# Patient Record
Sex: FEMALE | Race: WHITE
[De-identification: names, ages, dates, MRNs, and addresses within clinical notes are randomized per-mention and may not be internally consistent; named-entity substitution may affect disease eponyms.]

---

## 2018-01-19 ENCOUNTER — HOSPITAL ENCOUNTER (EMERGENCY)
Dept: HOSPITAL 74 - JERFT | Age: 53
Discharge: HOME | End: 2018-01-19
Payer: COMMERCIAL

## 2018-01-19 VITALS — HEART RATE: 107 BPM | SYSTOLIC BLOOD PRESSURE: 107 MMHG | TEMPERATURE: 99 F | DIASTOLIC BLOOD PRESSURE: 76 MMHG

## 2018-01-19 VITALS — BODY MASS INDEX: 30.9 KG/M2

## 2018-01-19 DIAGNOSIS — E11.9: ICD-10-CM

## 2018-01-19 DIAGNOSIS — Z79.84: ICD-10-CM

## 2018-01-19 DIAGNOSIS — E03.9: ICD-10-CM

## 2018-01-19 DIAGNOSIS — J20.9: Primary | ICD-10-CM

## 2018-01-19 PROCEDURE — 3E0F7GC INTRODUCTION OF OTHER THERAPEUTIC SUBSTANCE INTO RESPIRATORY TRACT, VIA NATURAL OR ARTIFICIAL OPENING: ICD-10-PCS | Performed by: NURSE PRACTITIONER

## 2018-01-19 NOTE — PDOC
Rapid Medical Evaluation


Chief Complaint: Asthma


Medical Evaluation: 


 Allergies











Allergy/AdvReac Type Severity Reaction Status Date / Time


 


No Known Drug Allergies Allergy   Verified 05/16/14 20:15











01/19/18 15:41


I have performed a brief in-person evaluation of this patient.





The patient presents with a chief complaint of:Body aches, cough, subj fever x 

2 days. H/o asthma, DM, HTN





Pertinent physical exam findings:Mildly tachycardic w/ minimal wheeze on exam





I have ordered the following:duoneb and flu swab





The patient will proceed to the ED for further evaluation.


01/19/18 15:44








**Discharge Disposition





- Referrals


Referrals: 


Krystal Benítez [Primary Care Provider] - 





- Patient Instructions





- Post Discharge Activity

## 2018-01-19 NOTE — PDOC
History of Present Illness





- General


Chief Complaint: Asthma


Stated Complaint: Asthma


Time Seen by Provider: 01/19/18 15:45


History Source: Patient


Exam Limitations: No Limitations





- History of Present Illness


Initial Comments: 





01/19/18 17:08


52-year-old female with history of asthma presents to the emergency room with 

complaints of cough, wheezing fever, chills, myalgia, and generalized malaise 

since yesterday. Patient states has been using her inhaler with minimal 

improvement and so decided come to the ER.


Timing/Duration: reports: yesterday


Severity: reports: moderate


Possible Cause: Yes: no prior episodes


Modifying Factors: improves with: coughing


Associated Symptoms: reports: cough, fever/chills, muscle aches, sore throat, 

wheezing





Past History





- Travel


Traveled outside of the country in the last 30 days: Yes





- Past Medical History


Allergies/Adverse Reactions: 


 Allergies











Allergy/AdvReac Type Severity Reaction Status Date / Time


 


No Known Drug Allergies Allergy   Verified 05/16/14 20:15











Home Medications: 


Ambulatory Orders





Enalapril Maleate [Vasotec -] 5 mg PO BID 05/16/14 


Hydrochlorothiazide [Hctz -] 25 mg PO DAILY 05/16/14 


Levothyroxine [Synthroid -] 75 mcg PO DAILY 05/16/14 


Metformin HCl [Glucophage -] 500 mg PO BID 05/16/14 


Naproxen Sodium [Anaprox Ds] 550 mg PO BID #60 tablet 05/27/14 








COPD: No


Diabetes: Yes


HTN: Yes


Thyroid Disease: Yes





- Suicide/Smoking/Psychosocial Hx


Smoking History: Never smoked


Have you smoked in the past 12 months: No


Information on smoking cessation initiated: No


Hx Alcohol Use: No


Drug/Substance Use Hx: No


Substance Use Type: None


Hx Substance Use Treatment: No


Patient Lives Alone: No


Lives with/in: spouse/SO





**Review of Systems





- Review of Systems


Able to Perform ROS?: Yes


Constitutional: Yes: Chills, Fever


HEENTM: Yes: Throat Pain


Respiratory: Yes: Cough, Wheezing


Cardiac (ROS): No: Symptoms Reported


ABD/GI: No: Symptoms Reported


: No: Symptoms Reported


Musculoskeletal: Yes: Joint Pain, Muscle Pain


Integumentary: No: Symptoms Reported


Neurological: Yes: Headache





*Physical Exam





- Vital Signs


 Last Vital Signs











Temp Pulse Resp BP Pulse Ox


 


 99.0 F   107 H  23   107/76   99 


 


 01/19/18 15:43  01/19/18 15:43  01/19/18 15:43  01/19/18 15:43  01/19/18 15:43














- Physical Exam


General Appearance: Yes: Nourished, Appropriately Dressed.  No: Apparent 

Distress


HEENT: positive: EOMI, JEANINE, TMs Normal, Pharynx Normal.  negative: Pale 

Conjunctivae


Respiratory/Chest: positive: Wheezing (bilateral expiratory intermittently)


Cardiovascular: positive: Regular Rhythm, Tachycardia.  negative: Murmur


Integumentary: positive: Normal Color, Warm, Moist


Neurologic: positive: Motor Strength 5/5 (ambulatory)





ED Treatment Course





- Medications


Given in the ED: 


ED Medications














Discontinued Medications














Generic Name Dose Route Start Last Admin





  Trade Name Jorgeq  PRN Reason Stop Dose Admin


 


Albuterol/Ipratropium  1 amp  01/19/18 15:44  01/19/18 17:03





  Duoneb -  NEB  01/19/18 15:45  1 amp





  ONCE ONE   Administration


 


Ibuprofen  600 mg  01/19/18 17:00  01/19/18 17:03





  Motrin -  PO  01/19/18 17:01  600 mg





  ONCE ONE   Administration














Medical Decision Making





- Medical Decision Making





01/19/18 17:13


Patient with URI symptoms and low-grade temperature. Patient ordered for 

albuterol secondary to wheezing, Motrin and awaiting influenza results


01/19/18 17:14


Influenza swab negative. Patient be discharged home with azithromycin and 

prednisone secondary to presentation of acute wheezy bronchitis.





*DC/Admit/Observation/Transfer


Diagnosis at time of Disposition: 


 Acute wheezy bronchitis








- Discharge Dispostion


Disposition: HOME


Condition at time of disposition: Good





- Referrals


Referrals: 


Krystal Benítez [Primary Care Provider] - 





- Patient Instructions


Printed Discharge Instructions:  DI for Acute Bronchitis


Additional Instructions: 


Please use your inhaler as previously prescribed by your primary care physician

, prednisone, and azithromycin as prescribed. If you develop a fever please 

take Motrin or Tylenol. 





- Post Discharge Activity